# Patient Record
Sex: FEMALE | Employment: FULL TIME | URBAN - METROPOLITAN AREA
[De-identification: names, ages, dates, MRNs, and addresses within clinical notes are randomized per-mention and may not be internally consistent; named-entity substitution may affect disease eponyms.]

---

## 2018-11-08 ENCOUNTER — HOSPITAL ENCOUNTER (OUTPATIENT)
Dept: MAMMOGRAPHY | Age: 53
Discharge: HOME | End: 2018-11-08
Attending: OBSTETRICS & GYNECOLOGY
Payer: COMMERCIAL

## 2018-11-08 DIAGNOSIS — Z12.31 VISIT FOR SCREENING MAMMOGRAM: ICD-10-CM

## 2018-11-08 PROCEDURE — 77063 BREAST TOMOSYNTHESIS BI: CPT

## 2019-04-23 ENCOUNTER — OFFICE VISIT (OUTPATIENT)
Dept: PRIMARY CARE CLINIC | Age: 54
End: 2019-04-23

## 2019-04-23 VITALS
TEMPERATURE: 99.8 F | RESPIRATION RATE: 18 BRPM | HEART RATE: 104 BPM | WEIGHT: 172.4 LBS | SYSTOLIC BLOOD PRESSURE: 120 MMHG | DIASTOLIC BLOOD PRESSURE: 86 MMHG | HEIGHT: 64 IN | OXYGEN SATURATION: 96 % | BODY MASS INDEX: 29.43 KG/M2

## 2019-04-23 DIAGNOSIS — J06.9 VIRAL UPPER RESPIRATORY TRACT INFECTION: Primary | ICD-10-CM

## 2019-04-23 DIAGNOSIS — R68.89 FLU-LIKE SYMPTOMS: ICD-10-CM

## 2019-04-23 RX ORDER — HYDROCHLOROTHIAZIDE 12.5 MG/1
CAPSULE ORAL
Refills: 1 | COMMUNITY
Start: 2019-01-29 | End: 2021-12-23

## 2019-04-23 RX ORDER — LISINOPRIL 40 MG/1
20 TABLET ORAL
Refills: 1 | COMMUNITY
Start: 2019-03-27

## 2019-04-23 NOTE — LETTER
NOTIFICATION RETURN TO WORK / SCHOOL 
 
4/23/2019 12:52 PM 
 
Ms. Daniel Solares [address] [facility] [address] [id number] To Whom It May Concern: 
 
Daniel Solares is currently under the care of [address] [place] [address]. She will return to work/school on: 04/24/19 If there are questions or concerns please have the patient contact our office.  
 
 
 
Sincerely, 
 
 
Peter Page NP

## 2019-04-23 NOTE — PROGRESS NOTES
Subjective:   Gibson Mejia is a 46 y.o. female who complains of congestion, nasal blockage, post nasal drip, dry cough, myalgias, headache and fever for 3 days, gradually worsening since that time. She denies a history of shortness of breath and wheezing. Evaluation to date: none. Treatment to date: OTC products. Patient does not smoke cigarettes. Relevant PMH: No pertinent additional PMH. Patient Active Problem List   Diagnosis Code    GI bleed K92.2     Patient Active Problem List    Diagnosis Date Noted    GI bleed 10/05/2015     Current Outpatient Medications   Medication Sig Dispense Refill    lisinopril (PRINIVIL, ZESTRIL) 40 mg tablet TAKE 1 TABLET BY MOUTH EVERY DAY  1    hydroCHLOROthiazide (MICROZIDE) 12.5 mg capsule TAKE 1 CAPSULE BY MOUTH EVERY DAY IN THE MORNING  1    RANITIDINE HCL PO Take  by mouth.  albuterol (PROVENTIL, VENTOLIN) 90 mcg/actuation inhaler Take 1-2 Puffs by inhalation every four (4) hours as needed for Wheezing. 17 g 0    lisinopril (PRINIVIL, ZESTRIL) 10 mg tablet Take 20 mg by mouth daily. Allergies   Allergen Reactions    Anaprox [Naproxen Sodium] Rash    Dpt-Haemophilus Ps(Tet.Conj.) Anaphylaxis and Seizures    Influenza Virus Vaccine, Specific Anaphylaxis    Codeine Hives     Past Medical History:   Diagnosis Date    DDD (degenerative disc disease)     Gastrointestinal disorder     Artem Castaneda Hypertension      Past Surgical History:   Procedure Laterality Date    HX GYN      D&C, fibroids?  SIGMOIDOSCOPY,DIAGNOSTIC  10/5/2015          Family History   Problem Relation Age of Onset    Hypertension Other      Social History     Tobacco Use    Smoking status: Never Smoker    Smokeless tobacco: Never Used   Substance Use Topics    Alcohol use: No        Review of Systems  Pertinent items are noted in HPI.     Objective:     Visit Vitals  /86   Pulse (!) 104   Temp 99.8 °F (37.7 °C) (Oral)   Resp 18   Ht 5' 4\" (1.626 m)   Wt 172 lb 6.4 oz (78.2 kg)   SpO2 96%   BMI 29.59 kg/m²     General:  alert, cooperative, no distress   Eyes: negative   Ears: normal TM's and external ear canals AU   Sinuses: Normal paranasal sinuses without tenderness   Mouth:  Lips, mucosa, and tongue normal. Teeth and gums normal   Neck: supple, symmetrical, trachea midline and no adenopathy. Heart: S1 and S2 normal, no murmurs noted. Lungs: clear to auscultation bilaterally   Abdomen: soft, non-tender. Bowel sounds normal. No masses,  no organomegaly        Assessment/Plan:   viral upper respiratory illness  Suggested symptomatic OTC remedies. RTC prn. Discussed diagnosis and treatment of viral URIs. Discussed the importance of avoiding unnecessary antibiotic therapy. ICD-10-CM ICD-9-CM    1. Viral upper respiratory tract infection J06.9 465.9    2. Flu-like symptoms R68.89 780.99 AMB POC RAPID INFLUENZA TEST   .

## 2019-04-23 NOTE — PATIENT INSTRUCTIONS
Upper Respiratory Infection: After Your Visit to the Emergency Room  Your Care Instructions  You were seen in the emergency room for an upper respiratory infection (URI). This is an infection of the nose, sinuses, or throat. Viruses or bacteria can cause URIs. Colds, flu, and sinusitis are examples of URIs. These infections are spread by coughs, sneezes, and close contact with people who have a URI. Your doctor may have given you antibiotics to treat the infection if it was caused by bacteria. But antibiotics will not help a viral infection. You can treat most infections with home care. This may include drinking lots of fluids and taking over-the-counter medicine for your symptoms. Even though you have been released from the emergency room, you still need to watch for any problems. The doctor carefully checked you. But sometimes problems can develop later. If you have new symptoms, or if your symptoms do not get better, return to the emergency room or call your doctor right away. A visit to the emergency room is only one step in your treatment. Even if you feel better, you still need to do what your doctor recommends, such as going to all suggested follow-up appointments and taking medicines exactly as directed. This will help you recover and help prevent future problems. How can you care for yourself at home? · To prevent dehydration, drink plenty of fluids, enough so that your urine is light yellow or clear like water. Choose water and other caffeine-free clear liquids until you feel better. If you have kidney, heart, or liver disease and have to limit fluids, talk with your doctor before you increase the amount of fluids you drink. · Take acetaminophen (Tylenol) or ibuprofen (Advil, Motrin) for fever or pain. Read and follow all instructions on the label. · If your doctor prescribed antibiotics, take them as directed. Do not stop taking them just because you feel better.  You need to take the full course of antibiotics. · Take cough medicine and a decongestant if your doctor suggests it. · Get plenty of rest.  · Use saline (saltwater) nasal washes to help keep your nasal passages open and wash out mucus and bacteria. You can buy saline nose drops at a grocery store or drugstore. Or you can make your own at home by adding 1 teaspoon of salt and 1 teaspoon of baking soda to 2 cups of distilled water. If you make your own, fill a bulb syringe with the solution, insert the tip into your nostril, and squeeze gently. Briana Males your nose. · Use a vaporizer or humidifier to add moisture to the air in your bedroom. Follow the instructions for cleaning it. · Do not smoke or allow others to smoke around you. If you need help quitting, talk to your doctor about stop-smoking programs and medicines. These can increase your chances of quitting for good. When should you call for help? Call 911 if:  · You have severe trouble breathing. Return to the emergency room now if:  · You have a fever with stiff neck or a severe headache. · You have signs of needing more fluids. You have sunken eyes, a dry mouth, and pass only a little dark urine. · You cannot keep down fluids or medicine. Call your doctor today if:  · You have a deep cough and a lot of mucus. · You are too tired to eat or drink. · You have a new symptom, such as a sore throat, an earache, or a rash. Where can you learn more? Go to [employer]  Enter H010 in the search box to learn more about \"Upper Respiratory Infection: After Your Visit to the Emergency Room. \"   © 9272-6233 [employer]wise, Incorporated. Care instructions adapted under license by [facility] [employer] (which disclaims liability or warranty for this information).  This care instruction is for use with your licensed healthcare professional. If you have questions about a medical condition or this instruction, always ask your healthcare professional. [address] any warranty or liability for your use of this information.   Content Version: 9.3.20651; Last Revised: February 13, 2012

## 2019-04-23 NOTE — PROGRESS NOTES
Chief Complaint   Patient presents with    Cold Symptoms     Pt c/o sinus pressure,scratchy throat and fatigue, onset this morning.

## 2019-04-24 LAB
QUICKVUE INFLUENZA TEST: NEGATIVE
VALID INTERNAL CONTROL?: YES

## 2019-04-29 ENCOUNTER — HOSPITAL ENCOUNTER (EMERGENCY)
Age: 54
Discharge: HOME | End: 2019-04-30
Attending: EMERGENCY MEDICINE
Payer: COMMERCIAL

## 2019-04-29 DIAGNOSIS — H92.03 OTALGIA OF BOTH EARS: ICD-10-CM

## 2019-04-29 DIAGNOSIS — J02.9 ACUTE PHARYNGITIS, UNSPECIFIED ETIOLOGY: Primary | ICD-10-CM

## 2019-04-29 PROCEDURE — 99282 EMERGENCY DEPT VISIT SF MDM: CPT

## 2019-04-29 NOTE — LETTER
[address] 
[facility] EMERGENCY DEPT 
[address] [place] [address] [address] [id number] [phone] Work/School Note Date: 4/29/2019 To Whom It May concern: 
 
Romaine Muller was seen and treated today in the emergency room by the following provider(s): 
Attending Provider: Bakari Coronado MD. Romaine Muller may return to work on 5/1/2019. Sincerely, Maninder Swartz

## 2019-04-30 VITALS
RESPIRATION RATE: 18 BRPM | OXYGEN SATURATION: 97 % | TEMPERATURE: 98.6 F | WEIGHT: 170.42 LBS | BODY MASS INDEX: 29.09 KG/M2 | SYSTOLIC BLOOD PRESSURE: 130 MMHG | DIASTOLIC BLOOD PRESSURE: 80 MMHG | HEART RATE: 85 BPM | HEIGHT: 64 IN

## 2019-04-30 LAB — DEPRECATED S PYO AG THROAT QL EIA: NEGATIVE

## 2019-04-30 PROCEDURE — 87070 CULTURE OTHR SPECIMN AEROBIC: CPT

## 2019-04-30 PROCEDURE — 87880 STREP A ASSAY W/OPTIC: CPT

## 2019-04-30 RX ORDER — AMOXICILLIN 500 MG/1
500 TABLET, FILM COATED ORAL 3 TIMES DAILY
Qty: 30 TAB | Refills: 0 | Status: SHIPPED | OUTPATIENT
Start: 2019-04-30 | End: 2021-12-23

## 2019-04-30 NOTE — ED PROVIDER NOTES
EMERGENCY DEPARTMENT HISTORY AND PHYSICAL EXAM 
 
 
Date: 4/29/2019 Patient Name: Jayshree Simpson History of Presenting Illness Chief Complaint Patient presents with  Sore Throat Reports sore throat for a week. Reports feeling tonsil swelling. Takes zyrtec at home for allergies  Ear Pain Reports excessive wax on left side and bilateral ear pressure History Provided By: Patient HPI: Jayshree Simpson, 46 y.o. female presents with sore throat and ear pain on the left. Patient states that a week ago she developed fevers and chills and then had a sore throat all week. On the day after the fevers and chills she had muscle aches and felt weak all over like she had been \"hit by a truck\". For the past couple of days she has had a cough that has been occasionally productive of green sputum. Tonight when she laid down she had worsening throat pain and felt like her throat was swollen and then today she had left-sided ear pain. She was seen at urgent care last week and diagnosed with an upper respiratory infection and had a flu swab that was negative she reports that she did not get the flu shot this year because she has an allergy to it. She denies nausea, vomiting, diarrhea, urinary symptoms, chest pain. There are no other complaints, changes, or physical findings at this time. PCP: Dionne Berrios MD 
 
No current facility-administered medications on file prior to encounter. Current Outpatient Medications on File Prior to Encounter Medication Sig Dispense Refill  lisinopril (PRINIVIL, ZESTRIL) 40 mg tablet TAKE 1 TABLET BY MOUTH EVERY DAY  1  
 hydroCHLOROthiazide (MICROZIDE) 12.5 mg capsule TAKE 1 CAPSULE BY MOUTH EVERY DAY IN THE MORNING  1  
 RANITIDINE HCL PO Take  by mouth. Past History Past Medical History: 
Past Medical History:  
Diagnosis Date  DDD (degenerative disc disease)  Gastrointestinal disorder Tharon Alert  Hypertension Past Surgical History: 
Past Surgical History:  
Procedure Laterality Date  HX GYN    
 D&C, fibroids?  SIGMOIDOSCOPY,DIAGNOSTIC  10/5/2015 Family History: 
Family History Problem Relation Age of Onset  Hypertension Other Social History: 
Social History Tobacco Use  Smoking status: Never Smoker  Smokeless tobacco: Never Used Substance Use Topics  Alcohol use: No  
 Drug use: No  
 
 
Allergies: Allergies Allergen Reactions  Anaprox [Naproxen Sodium] Rash  Dpt-Haemophilus Ps(Tet.Conj.) Anaphylaxis and Seizures  Influenza Virus Vaccine, Specific Anaphylaxis  Codeine Hives Review of Systems Review of Systems Constitutional: Positive for chills and fever (last Monday). HENT: Positive for congestion, ear pain, sore throat and trouble swallowing. Negative for drooling, ear discharge, mouth sores, rhinorrhea and sinus pressure. Eyes: Negative for redness and itching. Respiratory: Positive for cough. Negative for chest tightness, shortness of breath and wheezing. Cardiovascular: Negative for chest pain and palpitations. Gastrointestinal: Negative for diarrhea, nausea and vomiting. Genitourinary: Negative for dysuria, flank pain and hematuria. Musculoskeletal: Negative for back pain. Neurological: Negative for dizziness, syncope, light-headedness and numbness. Psychiatric/Behavioral: The patient is not nervous/anxious. Physical Exam  
General appearance - well nourished, well appearing, and in no distress Eyes - pupils equal and reactive, extraocular eye movements intact ENT - mucous membranes moist, pharynx erythematous without tonsillar hypertrophy or exudate, right TM clear, left TM obscured by cerumen Neck - supple, anterior cervical lymphadenopathy; non-tender to palpation Chest - clear to auscultation, no wheezes, rales or rhonchi; non-tender to palpation Heart - normal rate and regular rhythm, S1 and S2 normal, no murmurs noted Abdomen - soft, nontender, nondistended, no masses or organomegaly Musculoskeletal - no joint tenderness, deformity or swelling; normal ROM Extremities - peripheral pulses normal, no pedal edema Skin - normal coloration and turgor, no rashes Neurological - alert, oriented x3, normal speech, no focal findings or movement disorder noted Diagnostic Study Results Labs - No results found for this or any previous visit (from the past 12 hour(s)). Radiologic Studies - No orders to display CT Results  (Last 48 hours) None CXR Results  (Last 48 hours) None Medical Decision Making I am the first provider for this patient. I reviewed the vital signs, available nursing notes, past medical history, past surgical history, family history and social history. Vital Signs-Reviewed the patient's vital signs. Patient Vitals for the past 12 hrs: 
 Temp Pulse Resp BP SpO2  
04/29/19 2352    (!) 157/91   
04/29/19 2348     99 % 04/29/19 2333 98.6 °F (37 °C) 85 18 (!) 155/105 98 % Records Reviewed: Nursing Notes and Old Medical Records Provider Notes (Medical Decision Making):  
50-year-old presents with pharyngitis and left otalgia. Differential diagnosis includes viral syndrome, strep pharyngitis, postnasal drip with allergies. Will check strep test and anticipate empiric treatment for strep pharyngitis. ED Course:  
Initial assessment performed. The patients presenting problems have been discussed, and they are in agreement with the care plan formulated and outlined with them. I have encouraged them to ask questions as they arise throughout their visit. Disposition: 
[place] home PLAN: 
1. Current Discharge Medication List  
  
START taking these medications Details  
amoxicillin 500 mg tab Take 500 mg by mouth three (3) times daily. Qty: 30 Tab, Refills: 0 2.  
Follow-up Information Follow up With Specialties Details Why Contact Info [facility] EMERGENCY DEPT Emergency Medicine  If symptoms worsen [address] [facility] 
[phone] Rahel London MD Family Practice Schedule an appointment as soon as possible for a visit  [address] [address] [phone] Return to ED if worse Diagnosis Clinical Impression: 1. Acute pharyngitis, unspecified etiology 2. Otalgia of both ears

## 2019-04-30 NOTE — DISCHARGE INSTRUCTIONS
Patient Education        Earache: Care Instructions  Your Care Instructions    Even though infection is a common cause of ear pain, not all ear pain means an infection. If you have ear pain and don't have an infection, it could be because of a jaw problem, such as temporomandibular joint (TMJ) pain. Or it could be because of a neck problem. When ear discomfort or pain is mild or comes and goes without other symptoms, home treatment may be all you need. Follow-up care is a key part of your treatment and safety. Be sure to make and go to all appointments, and call your doctor if you are having problems. It's also a good idea to know your test results and keep a list of the medicines you take. How can you care for yourself at home? · Apply heat on the ear to ease pain. To apply heat, put a warm water bottle, a heating pad set on low, or a warm cloth on your ear. Do not go to sleep with a heating pad on your skin. · Take an over-the-counter pain medicine, such as acetaminophen (Tylenol), ibuprofen (Advil, Motrin), or naproxen (Aleve). Be safe with medicines. Read and follow all instructions on the label. · Do not take two or more pain medicines at the same time unless the doctor told you to. Many pain medicines have acetaminophen, which is Tylenol. Too much acetaminophen (Tylenol) can be harmful. · Never insert anything, such as a cotton swab or a petra pin, into the ear. When should you call for help? Call your doctor now or seek immediate medical care if:    · You have new or worse symptoms of infection, such as:  ? Increased pain, swelling, warmth, or redness. ? Red streaks leading from the area. ? Pus draining from the area. ? A fever.    Watch closely for changes in your health, and be sure to contact your doctor if:    · You have new or worse discharge coming from the ear.     · You do not get better as expected. Where can you learn more? Go to http://winifred-young.info/.   Enter U364 in the search box to learn more about \"Earache: Care Instructions. \"  Current as of: March 27, 2018  Content Version: 11.9  © 1385-7162 [employer]. Care instructions adapted under license by [employer] (which disclaims liability or warranty for this information). If you have questions about a medical condition or this instruction, always ask your healthcare professional. [address] any warranty or liability for your use of this information. Patient Education        Sore Throat: Care Instructions  Your Care Instructions    Infection by bacteria or a virus causes most sore throats. Cigarette smoke, dry air, air pollution, allergies, and yelling can also cause a sore throat. Sore throats can be painful and annoying. Fortunately, most sore throats go away on their own. If you have a bacterial infection, your doctor may prescribe antibiotics. Follow-up care is a key part of your treatment and safety. Be sure to make and go to all appointments, and call your doctor if you are having problems. It's also a good idea to know your test results and keep a list of the medicines you take. How can you care for yourself at home? · If your doctor prescribed antibiotics, take them as directed. Do not stop taking them just because you feel better. You need to take the full course of antibiotics. · Gargle with warm salt water once an hour to help reduce swelling and relieve discomfort. Use 1 teaspoon of salt mixed in 1 cup of warm water. · Take an over-the-counter pain medicine, such as acetaminophen (Tylenol), ibuprofen (Advil, Motrin), or naproxen (Aleve). Read and follow all instructions on the label. · Be careful when taking over-the-counter cold or flu medicines and Tylenol at the same time. Many of these medicines have acetaminophen, which is Tylenol. Read the labels to make sure that you are not taking more than the recommended dose.  Too much acetaminophen (Tylenol) can be harmful. · Drink plenty of fluids. Fluids may help soothe an irritated throat. Hot fluids, such as tea or soup, may help decrease throat pain. · Use over-the-counter throat lozenges to soothe pain. Regular cough drops or hard candy may also help. These should not be given to young children because of the risk of choking. · Do not smoke or allow others to smoke around you. If you need help quitting, talk to your doctor about stop-smoking programs and medicines. These can increase your chances of quitting for good. · Use a vaporizer or humidifier to add moisture to your bedroom. Follow the directions for cleaning the machine. When should you call for help? Call your doctor now or seek immediate medical care if:    · You have new or worse trouble swallowing.     · Your sore throat gets much worse on one side.    Watch closely for changes in your health, and be sure to contact your doctor if you do not get better as expected. Where can you learn more? Go to http://winifred-young.info/. Enter [id number] [id number] in the search box to learn more about \"Sore Throat: Care Instructions. \"  Current as of: March 27, 2018  Content Version: 11.9  © 2707-0061 [employer]. Care instructions adapted under license by [employer] (which disclaims liability or warranty for this information). If you have questions about a medical condition or this instruction, always ask your healthcare professional. [address] any warranty or liability for your use of this information.

## 2019-05-02 LAB
BACTERIA SPEC CULT: NORMAL
SERVICE CMNT-IMP: NORMAL

## 2019-07-10 ENCOUNTER — HOSPITAL ENCOUNTER (EMERGENCY)
Age: 54
Discharge: STILL ADMITTED OR ADMINISTRATIVE | End: 2019-07-10
Attending: EMERGENCY MEDICINE

## 2020-01-08 ENCOUNTER — OFFICE VISIT (OUTPATIENT)
Dept: PRIMARY CARE CLINIC | Age: 55
End: 2020-01-08

## 2020-01-08 VITALS
BODY MASS INDEX: 29.16 KG/M2 | HEART RATE: 79 BPM | SYSTOLIC BLOOD PRESSURE: 167 MMHG | DIASTOLIC BLOOD PRESSURE: 97 MMHG | WEIGHT: 170.8 LBS | TEMPERATURE: 98.3 F | RESPIRATION RATE: 18 BRPM | HEIGHT: 64 IN | OXYGEN SATURATION: 98 %

## 2020-01-08 DIAGNOSIS — R68.89 FLU-LIKE SYMPTOMS: Primary | ICD-10-CM

## 2020-01-08 LAB
FLUAV+FLUBV AG NOSE QL IA.RAPID: NEGATIVE POS/NEG
FLUAV+FLUBV AG NOSE QL IA.RAPID: NEGATIVE POS/NEG
VALID INTERNAL CONTROL?: YES

## 2020-01-08 RX ORDER — EZETIMIBE 10 MG/1
10 TABLET ORAL DAILY
COMMUNITY
End: 2021-12-23

## 2020-01-08 RX ORDER — GUAIFENESIN 1200 MG
1 TABLET, EXTENDED RELEASE 12 HR ORAL AS NEEDED
COMMUNITY
End: 2021-12-23

## 2020-01-08 NOTE — PROGRESS NOTES
Subjective:   Julio Cesar Ramirez is a 53 y.o. female who present complaining of flu-like symptoms:, sore throat  for 2 hours. She denies dyspnea or wheezing. Smoking status: non-smoker. Flu vaccine status: allergic to vaccine. Relevant PMH: No pertinent additional PMH. Review of Systems  A comprehensive review of systems was negative except for that written in the HPI. Patient Active Problem List   Diagnosis Code    GI bleed K92.2     Patient Active Problem List    Diagnosis Date Noted    GI bleed 10/05/2015     Current Outpatient Medications   Medication Sig Dispense Refill    acetaminophen (TYLENOL) 325 mg cap Take 1 Tab by mouth as needed.  ezetimibe (ZETIA) 10 mg tablet Take 10 mg by mouth daily.  lisinopril (PRINIVIL, ZESTRIL) 40 mg tablet TAKE 1 TABLET BY MOUTH EVERY DAY  1    hydroCHLOROthiazide (MICROZIDE) 12.5 mg capsule TAKE 1 CAPSULE BY MOUTH EVERY DAY IN THE MORNING  1    RANITIDINE HCL PO Take 150 mg by mouth two (2) times a day.  amoxicillin 500 mg tab Take 500 mg by mouth three (3) times daily. 30 Tab 0     Allergies   Allergen Reactions    Anaprox [Naproxen Sodium] Rash    Dpt-Haemophilus Ps(Tet.Conj.) Anaphylaxis and Seizures    Influenza Virus Vaccine, Specific Anaphylaxis    Codeine Hives     Past Medical History:   Diagnosis Date    DDD (degenerative disc disease)     Gastrointestinal disorder     Gris Goldsmith Hypertension      Past Surgical History:   Procedure Laterality Date    HX GYN      D&C, fibroids?     SIGMOIDOSCOPY,DIAGNOSTIC  10/5/2015          Family History   Problem Relation Age of Onset    Hypertension Other      Social History     Tobacco Use    Smoking status: Never Smoker    Smokeless tobacco: Never Used   Substance Use Topics    Alcohol use: No       Objective:     Visit Vitals  BP (!) 167/97   Pulse 79   Temp 98.3 °F (36.8 °C) (Oral)   Resp 18   Ht 5' 4\" (1.626 m)   Wt 170 lb 12.8 oz (77.5 kg)   SpO2 98%   BMI 29.32 kg/m²       Appears moderately ill but not toxic; temperature as noted in vitals. Ears normal.   Throat and pharynx normal.    Neck supple. No adenopathy in the neck. Sinuses non tender. The chest is clear. Flu Swab negative  Assessment/Plan:   Influenza unlikely  Symptomatic therapy suggested: rest, increase fluids and call prn if symptoms persist or worsen. Call or return to clinic prn if these symptoms worsen or fail to improve as anticipated.      ICD-10-CM ICD-9-CM    1. Flu-like symptoms R68.89 780.99 AMB POC ANTHONY INFLUENZA A/B TEST   .encouraged to follow up for blood pressure issues

## 2020-01-08 NOTE — PROGRESS NOTES
Chief Complaint   Patient presents with    Flu Like Symptoms     Patient in with sore throat and dryness since 3 pm this evening, exposure to flu at work

## 2020-02-04 ENCOUNTER — TELEPHONE (OUTPATIENT)
Dept: PRIMARY CARE CLINIC | Age: 55
End: 2020-02-04

## 2020-02-04 DIAGNOSIS — Z20.828 EXPOSURE TO INFLUENZA: Primary | ICD-10-CM

## 2020-02-04 RX ORDER — OSELTAMIVIR PHOSPHATE 75 MG/1
75 CAPSULE ORAL DAILY
Qty: 10 CAP | Refills: 0 | Status: SHIPPED | OUTPATIENT
Start: 2020-02-04 | End: 2020-02-14

## 2021-12-23 ENCOUNTER — HOSPITAL ENCOUNTER (EMERGENCY)
Age: 56
Discharge: HOME | End: 2021-12-23
Attending: EMERGENCY MEDICINE
Payer: COMMERCIAL

## 2021-12-23 VITALS
WEIGHT: 163.14 LBS | BODY MASS INDEX: 27.85 KG/M2 | HEART RATE: 92 BPM | HEIGHT: 64 IN | SYSTOLIC BLOOD PRESSURE: 138 MMHG | DIASTOLIC BLOOD PRESSURE: 81 MMHG | OXYGEN SATURATION: 99 % | TEMPERATURE: 97.4 F | RESPIRATION RATE: 16 BRPM

## 2021-12-23 DIAGNOSIS — R19.7 DIARRHEA, UNSPECIFIED TYPE: ICD-10-CM

## 2021-12-23 DIAGNOSIS — R10.9 INTERMITTENT ABDOMINAL PAIN: Primary | ICD-10-CM

## 2021-12-23 LAB
ALBUMIN SERPL-MCNC: 4 G/DL (ref 3.5–5)
ALBUMIN/GLOB SERPL: 1.1 {RATIO} (ref 1.1–2.2)
ALP SERPL-CCNC: 79 U/L (ref 45–117)
ALT SERPL-CCNC: 34 U/L (ref 12–78)
ANION GAP SERPL CALC-SCNC: 4 MMOL/L (ref 5–15)
AST SERPL-CCNC: 31 U/L (ref 15–37)
BILIRUB SERPL-MCNC: 0.5 MG/DL (ref 0.2–1)
BUN SERPL-MCNC: 21 MG/DL (ref 6–20)
BUN/CREAT SERPL: 22 (ref 12–20)
CALCIUM SERPL-MCNC: 9.5 MG/DL (ref 8.5–10.1)
CHLORIDE SERPL-SCNC: 105 MMOL/L (ref 97–108)
CO2 SERPL-SCNC: 30 MMOL/L (ref 21–32)
CREAT SERPL-MCNC: 0.94 MG/DL (ref 0.55–1.02)
ERYTHROCYTE [DISTWIDTH] IN BLOOD BY AUTOMATED COUNT: 11.7 % (ref 11.5–14.5)
GLOBULIN SER CALC-MCNC: 3.7 G/DL (ref 2–4)
GLUCOSE SERPL-MCNC: 114 MG/DL (ref 65–100)
HCT VFR BLD AUTO: 43.7 % (ref 35–47)
HGB BLD-MCNC: 14.8 G/DL (ref 11.5–16)
LIPASE SERPL-CCNC: 167 U/L (ref 73–393)
MCH RBC QN AUTO: 31.4 PG (ref 26–34)
MCHC RBC AUTO-ENTMCNC: 33.9 G/DL (ref 30–36.5)
MCV RBC AUTO: 92.6 FL (ref 80–99)
NRBC # BLD: 0 K/UL (ref 0–0.01)
NRBC BLD-RTO: 0 PER 100 WBC
PLATELET # BLD AUTO: 233 K/UL (ref 150–400)
PMV BLD AUTO: 10.5 FL (ref 8.9–12.9)
POTASSIUM SERPL-SCNC: 4.3 MMOL/L (ref 3.5–5.1)
PROT SERPL-MCNC: 7.7 G/DL (ref 6.4–8.2)
RBC # BLD AUTO: 4.72 M/UL (ref 3.8–5.2)
SODIUM SERPL-SCNC: 139 MMOL/L (ref 136–145)
WBC # BLD AUTO: 7.2 K/UL (ref 3.6–11)

## 2021-12-23 PROCEDURE — 36415 COLL VENOUS BLD VENIPUNCTURE: CPT

## 2021-12-23 PROCEDURE — 83690 ASSAY OF LIPASE: CPT

## 2021-12-23 PROCEDURE — 74011250636 HC RX REV CODE- 250/636: Performed by: EMERGENCY MEDICINE

## 2021-12-23 PROCEDURE — 80053 COMPREHEN METABOLIC PANEL: CPT

## 2021-12-23 PROCEDURE — 99284 EMERGENCY DEPT VISIT MOD MDM: CPT

## 2021-12-23 PROCEDURE — 85027 COMPLETE CBC AUTOMATED: CPT

## 2021-12-23 PROCEDURE — 74011250637 HC RX REV CODE- 250/637: Performed by: EMERGENCY MEDICINE

## 2021-12-23 RX ORDER — LOPERAMIDE HYDROCHLORIDE 2 MG/1
2 CAPSULE ORAL
Qty: 20 CAPSULE | Refills: 0 | Status: SHIPPED | OUTPATIENT
Start: 2021-12-23 | End: 2022-05-07 | Stop reason: ALTCHOICE

## 2021-12-23 RX ORDER — DICYCLOMINE HYDROCHLORIDE 10 MG/1
20 CAPSULE ORAL
Status: COMPLETED | OUTPATIENT
Start: 2021-12-23 | End: 2021-12-23

## 2021-12-23 RX ORDER — FAMOTIDINE 20 MG/1
20 TABLET, FILM COATED ORAL 2 TIMES DAILY
COMMUNITY

## 2021-12-23 RX ORDER — LOPERAMIDE HYDROCHLORIDE 2 MG/1
4 CAPSULE ORAL
Status: COMPLETED | OUTPATIENT
Start: 2021-12-23 | End: 2021-12-23

## 2021-12-23 RX ORDER — DICYCLOMINE HYDROCHLORIDE 20 MG/1
20 TABLET ORAL EVERY 6 HOURS
Qty: 20 TABLET | Refills: 0 | Status: SHIPPED | OUTPATIENT
Start: 2021-12-23

## 2021-12-23 RX ADMIN — SODIUM CHLORIDE 1000 ML: 9 INJECTION, SOLUTION INTRAVENOUS at 01:05

## 2021-12-23 RX ADMIN — LOPERAMIDE HYDROCHLORIDE 4 MG: 2 CAPSULE ORAL at 01:49

## 2021-12-23 RX ADMIN — DICYCLOMINE HYDROCHLORIDE 20 MG: 10 CAPSULE ORAL at 01:49

## 2021-12-23 NOTE — ED PROVIDER NOTES
EMERGENCY DEPARTMENT HISTORY AND PHYSICAL EXAM      Date: 12/23/2021  Patient Name: Christina Hodgson  Patient Age and Sex: 61 y.o. female     History of Presenting Illness     Chief Complaint   Patient presents with    Abdominal Pain     pt presents w/ c/ severe abdominal pain, pt report diarrhea since 11 pm now has blood in stool       History Provided By: Patient    HPI: Christina Hodgson is a 58-year-old female presenting with abdominal pain and diarrhea. Patient states that since 6 PM has been having abdominal pain that comes on as waves. States that it starts in the top of her abdomen bilaterally and goes down her abdomen in waves. Comes and goes. Associated with nausea when it comes on as well as sweating. Patient states that she is also been having diarrhea. Multiple episodes. Noticed some blood in the stool which is what prompted her to come in. Denies any urinary symptoms, fevers. Patient states that she hardly goes out of the house because of Covid pandemic. Patient states that today she did have breakfast made by her sons as well as a raspberry jam which was the only new things in her diet. There are no other complaints, changes, or physical findings at this time. PCP: Trevon Parker MD    No current facility-administered medications on file prior to encounter. Current Outpatient Medications on File Prior to Encounter   Medication Sig Dispense Refill    famotidine (PEPCID) 20 mg tablet Take 20 mg by mouth two (2) times a day.  lisinopril (PRINIVIL, ZESTRIL) 40 mg tablet 20 mg.  1    [DISCONTINUED] acetaminophen (TYLENOL) 325 mg cap Take 1 Tab by mouth as needed.  [DISCONTINUED] ezetimibe (ZETIA) 10 mg tablet Take 10 mg by mouth daily.  [DISCONTINUED] amoxicillin 500 mg tab Take 500 mg by mouth three (3) times daily.  30 Tab 0    [DISCONTINUED] hydroCHLOROthiazide (MICROZIDE) 12.5 mg capsule TAKE 1 CAPSULE BY MOUTH EVERY DAY IN THE MORNING  1    [DISCONTINUED] RANITIDINE HCL PO Take 150 mg by mouth two (2) times a day. Past History     Past Medical History:  Past Medical History:   Diagnosis Date    DDD (degenerative disc disease)     Gastrointestinal disorder     Radhika Rose Hypertension        Past Surgical History:  Past Surgical History:   Procedure Laterality Date    HX GYN      D&C, fibroids?  SIGMOIDOSCOPY,DIAGNOSTIC  10/5/2015            Family History:  Family History   Problem Relation Age of Onset    Hypertension Other        Social History:  Social History     Tobacco Use    Smoking status: Never Smoker    Smokeless tobacco: Never Used   Substance Use Topics    Alcohol use: No    Drug use: No       Allergies: Allergies   Allergen Reactions    Anaprox [Naproxen Sodium] Rash    Dpt-Haemophilus Ps(Tet.Conj.) Anaphylaxis and Seizures    Influenza Virus Vaccine, Specific Anaphylaxis    Codeine Hives         Review of Systems   Review of Systems   Constitutional: Negative for chills and fever. Respiratory: Negative for cough and shortness of breath. Cardiovascular: Negative for chest pain. Gastrointestinal: Positive for abdominal pain, blood in stool, diarrhea and nausea. Negative for constipation and vomiting. Genitourinary: Negative for dysuria, frequency and hematuria. Neurological: Negative for weakness and numbness. All other systems reviewed and are negative. Physical Exam   Physical Exam  Vitals and nursing note reviewed. Constitutional:       Appearance: She is well-developed. HENT:      Head: Normocephalic and atraumatic. Nose: Nose normal.      Mouth/Throat:      Mouth: Mucous membranes are moist.   Eyes:      Extraocular Movements: Extraocular movements intact. Conjunctiva/sclera: Conjunctivae normal.   Cardiovascular:      Rate and Rhythm: Normal rate and regular rhythm. Pulmonary:      Effort: Pulmonary effort is normal. No respiratory distress. Breath sounds: Normal breath sounds.    Abdominal:      General: There is no distension. Palpations: Abdomen is soft. Tenderness: There is no abdominal tenderness. Musculoskeletal:         General: Normal range of motion. Cervical back: Normal range of motion and neck supple. Skin:     General: Skin is warm and dry. Neurological:      General: No focal deficit present. Mental Status: She is alert and oriented to person, place, and time. Mental status is at baseline. Psychiatric:         Mood and Affect: Mood normal.          Diagnostic Study Results     Labs -     Recent Results (from the past 12 hour(s))   CBC W/O DIFF    Collection Time: 12/23/21 12:48 AM   Result Value Ref Range    WBC 7.2 3.6 - 11.0 K/uL    RBC 4.72 3.80 - 5.20 M/uL    HGB 14.8 11.5 - 16.0 g/dL    HCT 43.7 35.0 - 47.0 %    MCV 92.6 80.0 - 99.0 FL    MCH 31.4 26.0 - 34.0 PG    MCHC 33.9 30.0 - 36.5 g/dL    RDW 11.7 11.5 - 14.5 %    PLATELET 604 125 - 436 K/uL    MPV 10.5 8.9 - 12.9 FL    NRBC 0.0 0  WBC    ABSOLUTE NRBC 0.00 0.00 - 8.32 K/uL   METABOLIC PANEL, COMPREHENSIVE    Collection Time: 12/23/21 12:48 AM   Result Value Ref Range    Sodium 139 136 - 145 mmol/L    Potassium 4.3 3.5 - 5.1 mmol/L    Chloride 105 97 - 108 mmol/L    CO2 30 21 - 32 mmol/L    Anion gap 4 (L) 5 - 15 mmol/L    Glucose 114 (H) 65 - 100 mg/dL    BUN 21 (H) 6 - 20 MG/DL    Creatinine 0.94 0.55 - 1.02 MG/DL    BUN/Creatinine ratio 22 (H) 12 - 20      GFR est AA >60 >60 ml/min/1.73m2    GFR est non-AA >60 >60 ml/min/1.73m2    Calcium 9.5 8.5 - 10.1 MG/DL    Bilirubin, total 0.5 0.2 - 1.0 MG/DL    ALT (SGPT) 34 12 - 78 U/L    AST (SGOT) 31 15 - 37 U/L    Alk.  phosphatase 79 45 - 117 U/L    Protein, total 7.7 6.4 - 8.2 g/dL    Albumin 4.0 3.5 - 5.0 g/dL    Globulin 3.7 2.0 - 4.0 g/dL    A-G Ratio 1.1 1.1 - 2.2     LIPASE    Collection Time: 12/23/21 12:48 AM   Result Value Ref Range    Lipase 167 73 - 393 U/L       Radiologic Studies -   No orders to display     CT Results  (Last 48 hours)    None CXR Results  (Last 48 hours)    None            Medical Decision Making   I am the first provider for this patient. I reviewed the vital signs, available nursing notes, past medical history, past surgical history, family history and social history. Vital Signs-Reviewed the patient's vital signs. Patient Vitals for the past 12 hrs:   Temp Pulse Resp BP SpO2   12/23/21 0119     100 %   12/23/21 0041 97.4 °F (36.3 °C) 86 18 (!) 174/99 100 %       Records Reviewed: Nursing Notes and Old Medical Records    Provider Notes (Medical Decision Making):   Patient presenting with intermittent abdominal pain. Currently no abdominal pain and she has no tenderness whatsoever on exam.  Does appear slightly dry. Differential includes gastroenteritis, colitis, diverticulitis though less likely. ED Course:   Initial assessment performed. The patients presenting problems have been discussed, and they are in agreement with the care plan formulated and outlined with them. I have encouraged them to ask questions as they arise throughout their visit. Critical Care Time:   0    Disposition:  Discharge Note:  The patient has been re-evaluated and is ready for discharge. Reviewed available results with patient. Counseled patient on diagnosis and care plan. Patient has expressed understanding, and all questions have been answered. Patient agrees with plan and agrees to follow up as recommended, or to return to the ED if their symptoms worsen. Discharge instructions have been provided and explained to the patient, along with reasons to return to the ED. PLAN:  Current Discharge Medication List      START taking these medications    Details   dicyclomine (BENTYL) 20 mg tablet Take 1 Tablet by mouth every six (6) hours. Qty: 20 Tablet, Refills: 0  Start date: 12/23/2021      loperamide (IMODIUM) 2 mg capsule Take 1 Capsule by mouth four (4) times daily as needed for Diarrhea.   Qty: 20 Capsule, Refills: 0  Start date: 12/23/2021           2. Follow-up Information     Follow up With Specialties Details Why Contact Info    Ilona Leventhal, MD Family Medicine  As needed [address] [place] [address]  [phone]          3. Return to ED if worse     Diagnosis     Clinical Impression:   1. Intermittent abdominal pain    2. Diarrhea, unspecified type        Attestations:    Margaret Monahan M.D. Please note that this dictation was completed with "[employer] the computer voice recognition software. Quite often unanticipated grammatical, syntax, homophones, and other interpretive errors are inadvertently transcribed by the computer software. Please disregard these errors. Please excuse any errors that have escaped final proofreading. Thank you.

## 2021-12-23 NOTE — LETTER
[facility] EMERGENCY DEPT  [address] [place] [id number] [phone]    Work/School Note    Date: 12/23/2021    To Whom It May concern:    Jose Helms was seen and treated today in the emergency room by the following provider(s):  Attending Provider: River Stewart MD.      Jose Helms may return to work on 12/24/2021.      Sincerely,          Harjit Bertrand RN

## 2021-12-27 ENCOUNTER — OFFICE VISIT (OUTPATIENT)
Dept: PRIMARY CARE CLINIC | Age: 56
End: 2021-12-27

## 2021-12-27 VITALS
WEIGHT: 168.4 LBS | HEART RATE: 68 BPM | HEIGHT: 64 IN | DIASTOLIC BLOOD PRESSURE: 103 MMHG | SYSTOLIC BLOOD PRESSURE: 161 MMHG | TEMPERATURE: 98.2 F | BODY MASS INDEX: 28.75 KG/M2 | RESPIRATION RATE: 16 BRPM | OXYGEN SATURATION: 97 %

## 2021-12-27 DIAGNOSIS — S91.331A PUNCTURE WOUND OF RIGHT FOOT, INITIAL ENCOUNTER: Primary | ICD-10-CM

## 2021-12-27 PROCEDURE — 99213 OFFICE O/P EST LOW 20 MIN: CPT | Performed by: NURSE PRACTITIONER

## 2021-12-27 NOTE — PROGRESS NOTES
HISTORY OF PRESENT ILLNESS  Vishnu Hunt is a 61 y.o. female. HPI  Chief Complaint   Patient presents with    Puncture Wound     top of right foot puncture wound recipe box fell and fell apart landed on her foot yesterday aroun 1130 am     Pt presents with concerns about a puncture wound to top of right foot. Recipe box broke and fell onto her foot. She was barefoot at time of injury and a nail punctured her foot. Last Td > 20-30 years ago. No pain in foot. Has questions about updating tetanus vaccine. History of anaphylaxis and seizures after DTP and flu vaccines. Dr. Audra Alamo, PCP, has advised her to avoid vaccine including Covid vaccine. No pharmacy will administer Covid vaccine either. Past Medical History:   Diagnosis Date    DDD (degenerative disc disease)     Gastrointestinal disorder     Orin Blair Hypertension      Past Surgical History:   Procedure Laterality Date    HX GYN      D&C, fibroids?  SIGMOIDOSCOPY,DIAGNOSTIC  10/5/2015          Allergies   Allergen Reactions    Anaprox [Naproxen Sodium] Rash    Dpt-Haemophilus Ps(Tet.Conj.) Anaphylaxis and Seizures    Influenza Virus Vaccine, Specific Anaphylaxis    Codeine Hives       ROS  A comprehensive review of systems was obtained and negative except findings in the HPI    Physical Exam  Constitutional:       General: She is not in acute distress. Appearance: Normal appearance. She is not ill-appearing or toxic-appearing. Musculoskeletal:        Feet:    Feet:      Comments: Superficial puncture wound to top of right foot. No erythema or tenderness. No cellulitis. Normal ROM of foot. Neurological:      Mental Status: She is alert. ASSESSMENT and PLAN    ICD-10-CM ICD-9-CM    1. Puncture wound of right foot, initial encounter  S91.331A 892.0      Do not recommend Td or Tdap vaccine given her history. Should further discuss with PCP. Keep wound clean and dry. May apply antibiotic ointment. Follow-up prn.     Corby Warner Micah Morales, NP

## 2021-12-27 NOTE — PATIENT INSTRUCTIONS
Puncture Wounds: Care Instructions  Your Care Instructions     A puncture wound can happen anywhere on your body. These wounds tend to be narrower and deeper than cuts. A puncture wound is usually left open instead of being closed. This is because a puncture wound can be easily infected, and closing it can make infection even more likely. You will probably have a bandage over the wound. The doctor has checked you carefully, but problems can develop later. If you notice any problems or new symptoms, get medical treatment right away. Follow-up care is a key part of your treatment and safety. Be sure to make and go to all appointments, and call your doctor if you are having problems. It's also a good idea to know your test results and keep a list of the medicines you take. How can you care for yourself at home? · Keep the wound dry for the first 24 to 48 hours. After this, you can shower if your doctor okays it. Pat the wound dry. · Don't soak the wound, such as in a bathtub. Your doctor will tell you when it's safe to get the wound wet. · If your doctor told you how to care for your wound, follow your doctor's instructions. If you did not get instructions, follow this general advice:  ? After the first 24 to 48 hours, wash the wound with clean water 2 times a day. Don't use hydrogen peroxide or alcohol, which can slow healing. ? You may cover the wound with a thin layer of petroleum jelly, such as Vaseline, and a nonstick bandage. ? Apply more petroleum jelly and replace the bandage as needed. · Prop up the sore area on pillows anytime you sit or lie down during the next 3 days. Try to keep it above the level of your heart. This helps reduce swelling. · Avoid any activity that could cause your wound to get worse. · Be safe with medicines. Read and follow all instructions on the label. ? If the doctor gave you a prescription medicine for pain, take it as prescribed.   ? If you are not taking a prescription pain medicine, ask your doctor if you can take an over-the-counter medicine. · If your doctor prescribed antibiotics, take them as directed. Do not stop taking them just because you feel better. You need to take the full course of antibiotics. When should you call for help? Call your doctor now or seek immediate medical care if:    · You have new pain, or your pain gets worse.     · The wound starts to bleed, and blood soaks through the bandage. Oozing small amounts of blood is normal.     · The skin near the wound is cold or pale or changes color.     · You have tingling, weakness, or numbness near the wound.     · You have trouble moving the area near the wound.     · You have symptoms of infection, such as:  ? Increased pain, swelling, warmth, or redness around the wound. ? Red streaks leading from the wound. ? Pus draining from the wound. ? A fever. Watch closely for changes in your health, and be sure to contact your doctor if:    · The wound is not closing (getting smaller).     · You do not get better as expected. Where can you learn more? Go to http://www.gray.com/  Enter I253 in the search box to learn more about \"Puncture Wounds: Care Instructions. \"  Current as of: July 1, 2021               Content Version: 13.0  © 2006-2021 [employer]. Care instructions adapted under license by [employer] (which disclaims liability or warranty for this information). If you have questions about a medical condition or this instruction, always ask your healthcare professional. [place] [address] any warranty or liability for your use of this information.

## 2021-12-31 ENCOUNTER — APPOINTMENT (OUTPATIENT)
Dept: ULTRASOUND IMAGING | Age: 56
End: 2021-12-31
Attending: EMERGENCY MEDICINE
Payer: COMMERCIAL

## 2021-12-31 ENCOUNTER — HOSPITAL ENCOUNTER (EMERGENCY)
Age: 56
Discharge: HOME | End: 2021-12-31
Attending: EMERGENCY MEDICINE
Payer: COMMERCIAL

## 2021-12-31 VITALS
DIASTOLIC BLOOD PRESSURE: 90 MMHG | BODY MASS INDEX: 27.74 KG/M2 | HEIGHT: 64 IN | OXYGEN SATURATION: 100 % | WEIGHT: 162.48 LBS | SYSTOLIC BLOOD PRESSURE: 169 MMHG | HEART RATE: 81 BPM | RESPIRATION RATE: 18 BRPM | TEMPERATURE: 98.1 F

## 2021-12-31 DIAGNOSIS — M79.605 LEG PAIN, POSTERIOR, LEFT: Primary | ICD-10-CM

## 2021-12-31 PROCEDURE — 99281 EMR DPT VST MAYX REQ PHY/QHP: CPT

## 2021-12-31 PROCEDURE — 93971 EXTREMITY STUDY: CPT

## 2021-12-31 NOTE — ED PROVIDER NOTES
EMERGENCY DEPARTMENT HISTORY AND PHYSICAL EXAM      Date: 12/31/2021  Patient Name: Angie Vasques    History of Presenting Illness     Chief Complaint   Patient presents with    Knee Pain     left leg painful and swollen, onset yuesterday and progressed today with more pain       History Provided By: Patient    HPI: Angie Vasques, 61 y.o. female presents to the ED with discomfort in the back of her left leg behind her knee. Patient does not recall hurting her leg or knee in any way. Pain started yesterday, achy in quality, is not asking for anything for pain but pain got worse today prompting visit to the emergency department. She had a recent nail fall on her right foot but this is not the leg of complaint. She denies any chest pain, shortness of breath, palpitations. There is no immediate family history of pulmonary embolism but aunts and her family have had DVTs. Patient otherwise feels fine and is without complaint. She takes Tylenol as needed for pain. She has a history of acid reflux and potentially an ulcer so she avoids NSAIDs. There are no other complaints, changes, or physical findings at this time. PCP: Josselin Lozano MD    No current facility-administered medications on file prior to encounter. Current Outpatient Medications on File Prior to Encounter   Medication Sig Dispense Refill    famotidine (PEPCID) 20 mg tablet Take 20 mg by mouth two (2) times a day.  dicyclomine (BENTYL) 20 mg tablet Take 1 Tablet by mouth every six (6) hours. (Patient not taking: Reported on 12/27/2021) 20 Tablet 0    loperamide (IMODIUM) 2 mg capsule Take 1 Capsule by mouth four (4) times daily as needed for Diarrhea.  (Patient not taking: Reported on 12/27/2021) 20 Capsule 0    lisinopril (PRINIVIL, ZESTRIL) 40 mg tablet 20 mg.  1       Past History     Past Medical History:  Past Medical History:   Diagnosis Date    DDD (degenerative disc disease)     Gastrointestinal disorder     Silke June Hypertension Past Surgical History:  Past Surgical History:   Procedure Laterality Date    HX GYN      D&C, fibroids?  SIGMOIDOSCOPY,DIAGNOSTIC  10/5/2015            Family History:  Family History   Problem Relation Age of Onset    Hypertension Other        Social History:  Social History     Tobacco Use    Smoking status: Never Smoker    Smokeless tobacco: Never Used   Substance Use Topics    Alcohol use: No    Drug use: No       Allergies: Allergies   Allergen Reactions    Anaprox [Naproxen Sodium] Rash    Dpt-Haemophilus Ps(Tet.Conj.) Anaphylaxis and Seizures    Influenza Virus Vaccine, Specific Anaphylaxis    Codeine Hives         Review of Systems   Review of Systems   Constitutional: Negative for activity change and appetite change. HENT: Negative. Respiratory: Negative. Cardiovascular: Negative. Gastrointestinal: Negative. Musculoskeletal: Negative. Hematological: Does not bruise/bleed easily. All other systems reviewed and are negative. Physical Exam   Physical Exam   Vital signs and nursing notes reviewed    CONSTITUTIONAL: Alert, in mild distress; well-developed; well-nourished. HEAD:  Normocephalic, atraumatic  EYES: PERRL; EOM's intact. Neck:  Supple. trachea is midline. RESP: Chest clear, equal breath sounds. - W/R/R  CV: S1 and S2 WNL; No murmurs, gallops or rubs. 2+ radial and DP pulses bilaterally. BACK:  Non-tender, normal appearance  UPPER EXT:  Normal inspection. no joint or soft tissue swelling  LOWER EXT: Bilateral lower extremities are symmetric, no mottling of skin with distal PMS intact. Tenderness noted at the superior left calf with spider veins and varicose veins noted along the posterior leg overlying the posterior knee and lower upper leg. There is no overlying redness, induration or warmth or skin breaks.  There is no palpable or visible Baker's cyst.  NEURO: Alert and oriented x3, 5/5 strength and light touch sensation intact in bilateral upper and lower extremities. SKIN: No rashes; Warm and dry  PSYCH: Normal mood, normal affect    Diagnostic Study Results     Labs -   No results found for this or any previous visit (from the past 12 hour(s)). Radiologic Studies -   DUPLEX LOWER EXT VENOUS LEFT         Initial Result:    · No evidence of acute deep vein thrombosis in the left common femoral,   femoral, popliteal, posterior tibial, and peroneal veins. The veins were   imaged in the transverse and longitudinal planes. The vessels showed   normal color filling and compressibility. Doppler interrogation showed   phasic and spontaneous flow. CT Results  (Last 48 hours)    None        CXR Results  (Last 48 hours)    None          Medical Decision Making   I am the first provider for this patient. I reviewed the vital signs, available nursing notes, past medical history, past surgical history, family history and social history. Vital Signs-Reviewed the patient's vital signs. Patient Vitals for the past 12 hrs:   Temp Pulse Resp BP SpO2   12/31/21 0917 98.1 °F (36.7 °C) 81 18 (!) 169/90 100 %         Records Reviewed: Nursing Notes    Provider Notes (Medical Decision Making):   58-year-old female with reassuring ultrasound to rule out DVT without evidence of cellulitis, joint derangement or vascular emergency. I did  the patient that she should get a follow-up outpatient ultrasound if she still having the same pain in 7 days. Pain could certainly be from a very early thrombophlebitis superficially but again not visualized on ultrasound today. We discussed home care including compression stockings, thigh-high, Tylenol. Patient will continue to avoid NSAIDs secondary to gastric problems. For discharge. ED Course:   Initial assessment performed. The patients presenting problems have been discussed, and they are in agreement with the care plan formulated and outlined with them.   I have encouraged them to ask questions as they arise throughout their visit. Disposition:  Discharge    Discharge Note:  1:37 PM  The pt is ready for discharge. The pt's signs, symptoms, diagnosis, and discharge instructions have been discussed and pt has conveyed their understanding. The pt is to follow up as recommended or return to ER should their symptoms worsen. Plan has been discussed and pt is in agreement. DISCHARGE PLAN:  1. Current Discharge Medication List        2. Follow-up Information     Follow up With Specialties Details Why Contact Info    Leticia Barry MD Family Medicine  Please follow-up with your primary care doctor in the next week for reevaluation. [address] [place] [address]  [phone]          3. Return to ED if worse     Diagnosis     Clinical Impression:   1. Leg pain, posterior, left        Attestations:    Regla Kraft MD    Please note that this dictation was completed with [employer], the computer voice recognition software. Quite often unanticipated grammatical, syntax, homophones, and other interpretive errors are inadvertently transcribed by the computer software. Please disregard these errors. Please excuse any errors that have escaped final proofreading. Thank you.

## 2021-12-31 NOTE — DISCHARGE INSTRUCTIONS
You are seen here in the emergency department for left posterior leg pain. Your evaluation here including an exam and ultrasound was reassuring for any blood clot, skin infection, broken bone or dislocation. You can resume your normal activities but if your pain persists in 1 week, please follow-up with your primary care doctor for repeat outpatient ultrasound. Thank you for letting us take care of you today.

## 2022-05-07 ENCOUNTER — OFFICE VISIT (OUTPATIENT)
Dept: PRIMARY CARE CLINIC | Age: 57
End: 2022-05-07

## 2022-05-07 VITALS
SYSTOLIC BLOOD PRESSURE: 140 MMHG | TEMPERATURE: 97.1 F | WEIGHT: 167.7 LBS | OXYGEN SATURATION: 98 % | HEIGHT: 64 IN | DIASTOLIC BLOOD PRESSURE: 95 MMHG | HEART RATE: 71 BPM | BODY MASS INDEX: 28.63 KG/M2 | RESPIRATION RATE: 18 BRPM

## 2022-05-07 DIAGNOSIS — H92.02 LEFT EAR PAIN: ICD-10-CM

## 2022-05-07 DIAGNOSIS — J30.1 ALLERGIC RHINITIS DUE TO POLLEN, UNSPECIFIED SEASONALITY: Primary | ICD-10-CM

## 2022-05-07 DIAGNOSIS — H61.23 BILATERAL IMPACTED CERUMEN: ICD-10-CM

## 2022-05-07 PROBLEM — R30.0 DYSURIA: Status: ACTIVE | Noted: 2019-05-09

## 2022-05-07 PROCEDURE — 69210 REMOVE IMPACTED EAR WAX UNI: CPT | Performed by: NURSE PRACTITIONER

## 2022-05-07 PROCEDURE — 99203 OFFICE O/P NEW LOW 30 MIN: CPT | Performed by: NURSE PRACTITIONER

## 2022-05-07 RX ORDER — CLINDAMYCIN PHOSPHATE 10 UG/ML
LOTION TOPICAL
COMMUNITY
Start: 2022-04-14

## 2022-05-07 NOTE — PROGRESS NOTES
Chief Complaint   Patient presents with    Ear Pain     left ear     1. \"Have you been to the ER, urgent care clinic since your last visit? Hospitalized since your last visit? \" No    2. \"Have you seen or consulted any other health care providers outside of the [address] [place] [address] since your last visit? \" No

## 2022-05-07 NOTE — PATIENT INSTRUCTIONS
Managing Your Allergies: Care Instructions  Your Care Instructions     Managing your allergies is an important part of staying healthy. Your doctor will help you find out what may be the cause of the allergies. Common causes of symptoms are house dust and dust mites, animal dander, mold, and pollen. As soon as you know what triggers your symptoms, try to avoid those things. This can help prevent allergy symptoms, asthma, and other health problems. Ask your doctor about allergy medicine or immunotherapy. These treatments may help reduce or prevent allergy symptoms. Follow-up care is a key part of your treatment and safety. Be sure to make and go to all appointments, and call your doctor if you are having problems. It's also a good idea to know your test results and keep a list of the medicines you take. How can you care for yourself at home? · If you have been told by your doctor that dust or dust mites are causing your allergy, decrease the dust around your bed:  ? Wash sheets, pillowcases, and other bedding in hot water every week. ? Use dust-proof covers for pillows, duvets, and mattresses. Avoid plastic covers because they tear easily and do not \"breathe. \" Wash as instructed on the label. ? Do not use any blankets and pillows that you do not need. ? Use blankets that you can wash in your washing machine. ? Consider removing drapes and carpets, which attract and hold dust, from your bedroom. · If you are allergic to house dust and mites, do not use home humidifiers. Your doctor can suggest ways you can control dust and mites. · Look for signs of cockroaches. Cockroaches cause allergic reactions. Use cockroach baits to get rid of them. Then, clean your home well. Cockroaches like areas where grocery bags, newspapers, empty bottles, or cardboard boxes are stored. Do not keep these inside your home, and keep trash and food containers sealed.  Seal off any spots where cockroaches might enter your home.  · If you are allergic to mold, get rid of furniture, rugs, and drapes that smell musty. Check for mold in the bathroom. · If you are allergic to outdoor pollen or mold spores, use air-conditioning. Change or clean all filters every month. Keep windows closed. · If you are allergic to pollen, stay inside when pollen counts are high. Use a vacuum  with a HEPA filter or a double-thickness filter at least two times each week. · Stay inside when air pollution is bad. Avoid paint fumes, perfumes, and other strong odors. · Avoid conditions that make your allergies worse. Stay away from smoke. Do not smoke or let anyone else smoke in your house. Do not use fireplaces or wood-burning stoves. · If you are allergic to your pets, change the air filter in your furnace every month. Use high-efficiency filters. · If you are allergic to pet dander, keep pets outside or out of your bedroom. Old carpet and cloth furniture can hold a lot of animal dander. You may need to replace them. When should you call for help? Give an epinephrine shot if:    · You think you are having a severe allergic reaction. After giving an epinephrine shot call 911, even if you feel better. Call 911 if:    · You have symptoms of a severe allergic reaction. These may include:  ? Sudden raised, red areas (hives) all over your body. ? Swelling of the throat, mouth, lips, or tongue. ? Trouble breathing. ? Passing out (losing consciousness). Or you may feel very lightheaded or suddenly feel weak, confused, or restless.     · You have been given an epinephrine shot, even if you feel better. Call your doctor now or seek immediate medical care if:    · You have symptoms of an allergic reaction, such as:  ? A rash or hives (raised, red areas on the skin). ? Itching. ? Swelling. ? Belly pain, nausea, or vomiting.    Watch closely for changes in your health, and be sure to contact your doctor if:    · Your allergies get worse.     · You need help controlling your allergies.     · You have questions about allergy testing.     · You do not get better as expected. Where can you learn more? Go to http://www.gray.com/  Enter L249 in the search box to learn more about \"Managing Your Allergies: Care Instructions. \"  Current as of: February 10, 2021               Content Version: 13.2  © 0368-6858 [employer]. Care instructions adapted under license by [employer] (which disclaims liability or warranty for this information). If you have questions about a medical condition or this instruction, always ask your healthcare professional. [place] [address] any warranty or liability for your use of this information.

## 2022-05-07 NOTE — PROGRESS NOTES
HISTORY OF PRESENT ILLNESS  Rafal Kaba is a 61 y.o. female. Patient with a few hours of left ear pain. Ear  Pain woke patient from sleep this am. Denies drainage, trauma , headache, dizziness or fever. More like pressure. Itching so bad. Woke with finger in ear. Woke with decreased hearing. Last doctor told her does have a lot of ear wax  No congestion, sore throat or cough. Does have allergies and had ear and throat itching for weeks. Takes for allergies- takes nothing. Past Medical History:   Diagnosis Date    DDD (degenerative disc disease)     Gastrointestinal disorder     Clementeen Divers Hypertension      Past Surgical History:   Procedure Laterality Date    HX GYN      D&C, fibroids?  SIGMOIDOSCOPY,DIAGNOSTIC  10/5/2015            Review of Systems   Constitutional: Negative for fever and malaise/fatigue. HENT: Positive for ear pain and hearing loss. Negative for congestion and ear discharge. Eyes: Negative for redness. Respiratory: Negative for cough. Gastrointestinal: Negative for nausea and vomiting. Skin: Positive for itching. Neurological: Negative for dizziness and headaches. Physical Exam  Vitals and nursing note reviewed. Constitutional:       Appearance: Normal appearance. HENT:      Head: Normocephalic and atraumatic. Right Ear: There is impacted cerumen. Left Ear: There is impacted cerumen. Ears:      Comments: Right ear 1/2 cerumen impaction  Left ear completely impacted. Lavage performed . Cerumen removed without difficulty. Patient tolerated procedure well. Nose: No congestion. Mouth/Throat:      Mouth: Mucous membranes are moist.      Pharynx: No posterior oropharyngeal erythema. Eyes:      Pupils: Pupils are equal, round, and reactive to light. Cardiovascular:      Rate and Rhythm: Normal rate. Pulses: Normal pulses. Heart sounds: Normal heart sounds.    Pulmonary:      Effort: Pulmonary effort is normal.      Breath sounds: Normal breath sounds. Musculoskeletal:         General: Normal range of motion. Cervical back: Normal range of motion. Lymphadenopathy:      Cervical: No cervical adenopathy. Neurological:      General: No focal deficit present. Mental Status: She is alert. Psychiatric:         Mood and Affect: Mood normal.         ASSESSMENT and PLAN    ICD-10-CM ICD-9-CM    1. Allergic rhinitis due to pollen, unspecified seasonality  J30.1 477.0    2. Bilateral impacted cerumen  H61.23 380.4 REMOVE IMPACTED EAR WAX   3. Left ear pain  H92.02 388.70      Encounter Diagnoses   Name Primary?  Allergic rhinitis due to pollen, unspecified seasonality Yes    Bilateral impacted cerumen     Left ear pain      Orders Placed This Encounter    REMOVE IMPACTED EAR WAX    clindamycin (CLEOCIN T) 1 % lotion    fluticasone (VERAMYST) 27.5 mcg/actuation nasal spray   Daily flonase and Allegra  Debrox as needed.  You may experience mild termporary dizziness  Return PRN  Signed By: SARAH Martins     May 7, 2022

## 2022-07-14 ENCOUNTER — OFFICE VISIT (OUTPATIENT)
Dept: PRIMARY CARE CLINIC | Age: 57
End: 2022-07-14

## 2022-07-14 VITALS
HEIGHT: 64 IN | TEMPERATURE: 97.8 F | BODY MASS INDEX: 29.71 KG/M2 | DIASTOLIC BLOOD PRESSURE: 100 MMHG | RESPIRATION RATE: 20 BRPM | WEIGHT: 174 LBS | OXYGEN SATURATION: 96 % | HEART RATE: 96 BPM | SYSTOLIC BLOOD PRESSURE: 150 MMHG

## 2022-07-14 DIAGNOSIS — S93.602A FOOT SPRAIN, LEFT, INITIAL ENCOUNTER: Primary | ICD-10-CM

## 2022-07-14 DIAGNOSIS — M79.672 PAIN IN LEFT FOOT: ICD-10-CM

## 2022-07-14 DIAGNOSIS — M25.572 ACUTE LEFT ANKLE PAIN: ICD-10-CM

## 2022-07-14 PROCEDURE — 99213 OFFICE O/P EST LOW 20 MIN: CPT | Performed by: NURSE PRACTITIONER

## 2022-07-14 NOTE — PROGRESS NOTES
Subjective:      Italia Lam is a 61 y.o. female seen for evaluation and treatment of a left ankle and left foot injury. This is evaluated as a personal injury. The injury was sustained a few days ago, and occurred while walking in her neighbor's yard. Stepped in a hole, rolled her ankle and rolled. Similar incident about a month prior. States that in last few years has rolled ankle about 15 times. She did not hear or sense a pop or snap at the time of the injury. The patient notes pain and mild swelling since the injury. She has not injured this site in the past.  Pain is mostly when walking. No pain at rest.  Taking tylenol for pain, avoids NSAIDS due to GI side effects    Past Medical History:   Diagnosis Date    DDD (degenerative disc disease)     Gastrointestinal disorder     Marmali Son Hypertension      Past Surgical History:   Procedure Laterality Date    HX GYN      D&C, fibroids?  SIGMOIDOSCOPY,DIAGNOSTIC  10/5/2015          Allergies   Allergen Reactions    Anaprox [Naproxen Sodium] Rash    Dpt-Haemophilus Ps(Tet.Conj.) Anaphylaxis and Seizures    Influenza Virus Vaccine, Specific Anaphylaxis    Flu Vac PL1134(27YI,AN)OKC(BY) Diarrhea    Codeine Hives         Objective:     Visit Vitals  BP (!) 150/100   Pulse 96   Temp 97.8 °F (36.6 °C) (Temporal)   Resp 20   Ht 5' 4\" (1.626 m)   Wt 174 lb (78.9 kg)   SpO2 96%   BMI 29.87 kg/m²      Appearance: alert, well appearing, and in no distress. Musculoskeletal exam: Left ankle and left foot:  no joint tenderness, deformity or swelling, no muscular tenderness noted, full range of motion without pain. Antalgic gait. Imaging  is performed, appears normal - no fracture    Assessment/Plan:       ICD-10-CM ICD-9-CM    1. Foot sprain, left, initial encounter  S93.602A 845.10 REFERRAL TO ORTHOPEDICS   2. Pain in left foot  M79.672 729.5 XR FOOT LT MIN 3 V      REFERRAL TO ORTHOPEDICS   3.  Acute left ankle pain  M25.572 719.47 XR ANKLE LT MIN 3 V      REFERRAL TO ORTHOPEDICS       The patient is advised to rest, apply cold or ice intermittently and elevate affected extremity. Continue tylenol. See Ortho.     Farhan Micheel, NP

## 2022-07-14 NOTE — PROGRESS NOTES
Chief Complaint   Patient presents with    Ankle Pain     Patient c/o stepping in a hole and rolling her left ankle one week ago, she's c/o burning pain across the top of her foot and in her ankle joint     Visit Vitals  BP (!) 197/136   Pulse 96   Temp 97.8 °F (36.6 °C) (Temporal)   Resp 20   Ht 5' 4\" (1.626 m)   Wt 174 lb (78.9 kg)   SpO2 96%   BMI 29.87 kg/m²

## 2022-07-14 NOTE — PATIENT INSTRUCTIONS
Foot Sprain: Care Instructions  Your Care Instructions     A foot sprain occurs when you stretch or tear the ligaments around your foot. Ligaments are the tough tissues that connect one bone to another. A sprain can happen when you run, fall, or hit your toe against something. Sprains often happen when you jump or change direction quickly. This may occur when you play basketball, soccer, or other sports. Most foot sprains will get better with treatment at home. Follow-up care is a key part of your treatment and safety. Be sure to make and go to all appointments, and call your doctor if you are having problems. It's also a good idea to know your test results and keep a list of the medicines you take. How can you care for yourself at home? · Walk or put weight on your sprained foot as long as it does not hurt. · If your doctor gave you a splint or immobilizer, wear it as directed. If you were given crutches, use them as directed. · For the first 2 days after your injury, avoid hot showers, hot tubs, or hot packs. They may increase swelling. · Put ice or a cold pack on your foot for 10 to 20 minutes at a time to stop swelling. Try this every 1 to 2 hours for 3 days (when you are awake) or until the swelling goes down. Put a thin cloth between the ice pack and your skin. Keep your splint dry. · After 2 or 3 days, if your swelling is gone, put a heating pad (set on low) or a warm cloth on your foot. Some doctors suggest that you go back and forth between hot and cold treatments. · Prop up your foot on a pillow when you ice it or anytime you sit or lie down. Try to keep it above the level of your heart. This will help reduce swelling. · Take pain medicines exactly as directed. ? If the doctor gave you a prescription medicine for pain, take it as prescribed. ? If you are not taking a prescription pain medicine, ask your doctor if you can take an over-the-counter medicine.   · Do any exercises that your doctor or physical therapist suggests. · Return to your usual exercise gradually as you feel better. When should you call for help? Call your doctor now or seek immediate medical care if:    · You have increased or severe pain.     · Your toes are cool or pale or change color.     · Your wrap or splint feels too tight.     · You have signs of a blood clot, such as:  ? Pain in your calf, back of the knee, thigh, or groin. ? Redness and swelling in your leg or groin.     · You have tingling, weakness, or numbness in your leg or foot. Watch closely for changes in your health, and be sure to contact your doctor if:    · You cannot put any weight on your foot.     · You get a fever.     · You do not get better as expected. Where can you learn more? Go to http://www.gray.com/  Enter K514 in the search box to learn more about \"Foot Sprain: Care Instructions. \"  Current as of: July 1, 2021               Content Version: 13.2  © 2006-2022 [employer]. Care instructions adapted under license by [employer] (which disclaims liability or warranty for this information). If you have questions about a medical condition or this instruction, always ask your healthcare professional. [address] any warranty or liability for your use of this information.

## 2023-04-04 ENCOUNTER — OFFICE VISIT (OUTPATIENT)
Dept: SURGERY | Age: 58
End: 2023-04-04
Payer: COMMERCIAL

## 2023-04-04 PROCEDURE — 99202 OFFICE O/P NEW SF 15 MIN: CPT | Performed by: SURGERY

## 2023-04-04 NOTE — PROGRESS NOTES
Surgery History and Physical    Subjective:      Tenisha Monday is a 61 y.o. female who presents for evaluation of lump on the side of her neck. She had an appointment with her obgyn and was referred here. She noticed it a couple of months ago. Her  had COVID about 6 weeks ago. She noticed it more after he had COVID. She didn't get it. No exposure to cats. No weight loss or weight gain. No night sweats. No malaise. No history of radiation. Mom  47 of lung cancer. Her aunt has cervical cancer. Past Medical History:   Diagnosis Date    DDD (degenerative disc disease)     Gastrointestinal disorder     Gerd    Hypertension      Past Surgical History:   Procedure Laterality Date    HX GYN      D&C, fibroids? SIGMOIDOSCOPY,DIAGNOSTIC  10/5/2015           Family History   Problem Relation Age of Onset    Hypertension Other      Social History     Tobacco Use    Smoking status: Never    Smokeless tobacco: Never   Substance Use Topics    Alcohol use: No      Prior to Admission medications    Medication Sig Start Date End Date Taking? Authorizing Provider   clindamycin (CLEOCIN T) 1 % lotion APPLY TO FACE TWICE DAILY 22  Yes Provider, Historical   famotidine (PEPCID) 20 mg tablet Take 20 mg by mouth two (2) times a day. Yes Other, Phys, MD   lisinopril (PRINIVIL, ZESTRIL) 40 mg tablet 20 mg. 3/27/19  Yes Provider, Historical      Allergies   Allergen Reactions    Anaprox [Naproxen Sodium] Rash    Dpt-Haemophilus Ps(Tet.Conj.) Anaphylaxis and Seizures    Influenza Virus Vaccine, Specific Anaphylaxis    Flu Vac IQ4219(02WW,NE)UVS(LI) Diarrhea    Codeine Hives       Review of Systems:  A comprehensive review of systems was negative except for that written in the History of Present Illness.     Objective:     Visit Vitals  /74 (BP 1 Location: Left upper arm, BP Patient Position: Sitting, BP Cuff Size: Small adult)   Pulse 74   Temp 97.3 °F (36.3 °C) (Temporal)   Resp 18   Ht 5' 4\" (1.626 m)   Wt 78.4 kg (172 lb 12.8 oz)   SpO2 96%   BMI 29.66 kg/m²         Physical Exam:  Physical Exam:  General:  Alert, cooperative, no distress, appears stated age. Eyes:  Conjunctivae/corneas clear. Ears:  Normal external ear canals both ears. Nose: Nares normal. Septum midline. Mouth/Throat: Wearing a mask   Neck: Supple, symmetrical, trachea midline. Palpable right lymph node   Back:   Symmetric, no curvature. ROM normal.    Lungs:   Clear to auscultation bilaterally. Heart:  Regular rate and rhythm   Abdomen:   Soft, non-tender. Bowel sounds normal. No masses,  No organomegaly. Extremities: Extremities normal, atraumatic, no cyanosis or edema. Skin: Skin color, texture, turgor normal. No rashes or lesions         Assessment:     61year old female with enlarged lymph node of the neck    Plan:      Will obtain a neck US for completeness  Will dsicuss results with the patient once completed

## 2023-04-04 NOTE — PROGRESS NOTES
Identified pt with two pt identifiers (name and ). Reviewed chart in preparation for visit and have obtained necessary documentation. Tenisha Monday is a 61 y.o. female  Chief Complaint   Patient presents with    Skin Problem     Seen at the request of deidra Ugalde left lymph node       Visit Vitals  /74 (BP 1 Location: Left upper arm, BP Patient Position: Sitting, BP Cuff Size: Small adult)   Pulse 74   Temp 97.3 °F (36.3 °C) (Temporal)   Resp 18   Ht 5' 4\" (1.626 m)   Wt 78.4 kg (172 lb 12.8 oz)   SpO2 96%   BMI 29.66 kg/m²       1. Have you been to the ER, urgent care clinic since your last visit? Hospitalized since your last visit? No    2. Have you seen or consulted any other health care providers outside of the [address] [place] [address] since your last visit? Include any pap smears or colon screening.  No

## 2023-04-04 NOTE — LETTER
4/4/2023    Patient: Linda Servin   YOB: 1965   Date of Visit: 4/4/2023     Rocael Villagomez MD  [facility] [address]  Via Fax: [phone]     [facility], [address] [place] [address] [address] [phone]  Via Fax: [phone]    Dear MD Diamond Chu MD,      Thank you for referring Ms. Leigh Recinos to  [facility] for evaluation. My notes for this consultation are attached. If you have questions, please do not hesitate to call me. I look forward to following your patient along with you.       Sincerely,    Randi Mills MD

## 2023-04-13 ENCOUNTER — HOSPITAL ENCOUNTER (OUTPATIENT)
Dept: ULTRASOUND IMAGING | Age: 58
Discharge: HOME | End: 2023-04-13
Attending: SURGERY
Payer: COMMERCIAL

## 2023-04-13 DIAGNOSIS — R59.1 LYMPHADENOPATHY: ICD-10-CM

## 2023-04-13 PROCEDURE — 76536 US EXAM OF HEAD AND NECK: CPT

## 2023-04-17 ENCOUNTER — TELEPHONE (OUTPATIENT)
Dept: SURGERY | Age: 58
End: 2023-04-17

## 2023-04-17 NOTE — TELEPHONE ENCOUNTER
Pt sent my chart link so she may send documents to [facility]. Pt will come to office to sign HIPPA release if needed.

## 2023-04-17 NOTE — TELEPHONE ENCOUNTER
[facility] called in regards to needing recent office note and imaging and demographics faxed to [phone] call back num